# Patient Record
Sex: FEMALE | Race: NATIVE HAWAIIAN OR OTHER PACIFIC ISLANDER | HISPANIC OR LATINO | URBAN - METROPOLITAN AREA
[De-identification: names, ages, dates, MRNs, and addresses within clinical notes are randomized per-mention and may not be internally consistent; named-entity substitution may affect disease eponyms.]

---

## 2024-11-12 ENCOUNTER — OFFICE VISIT (OUTPATIENT)
Dept: FAMILY MEDICINE CLINIC | Facility: CLINIC | Age: 64
End: 2024-11-12
Payer: MEDICAID

## 2024-11-12 VITALS
HEART RATE: 90 BPM | WEIGHT: 187.4 LBS | OXYGEN SATURATION: 98 % | DIASTOLIC BLOOD PRESSURE: 88 MMHG | SYSTOLIC BLOOD PRESSURE: 130 MMHG | BODY MASS INDEX: 33.2 KG/M2 | HEIGHT: 63 IN

## 2024-11-12 DIAGNOSIS — I10 PRIMARY HYPERTENSION: Primary | Chronic | ICD-10-CM

## 2024-11-12 DIAGNOSIS — R20.0 NUMBNESS OF LEFT HAND: Chronic | ICD-10-CM

## 2024-11-12 DIAGNOSIS — Z13.0 SCREENING FOR DEFICIENCY ANEMIA: ICD-10-CM

## 2024-11-12 DIAGNOSIS — Z13.220 SCREENING CHOLESTEROL LEVEL: ICD-10-CM

## 2024-11-12 PROCEDURE — 3079F DIAST BP 80-89 MM HG: CPT | Performed by: NURSE PRACTITIONER

## 2024-11-12 PROCEDURE — 99204 OFFICE O/P NEW MOD 45 MIN: CPT | Performed by: NURSE PRACTITIONER

## 2024-11-12 PROCEDURE — 3075F SYST BP GE 130 - 139MM HG: CPT | Performed by: NURSE PRACTITIONER

## 2024-11-12 RX ORDER — DULOXETIN HYDROCHLORIDE 30 MG/1
30 CAPSULE, DELAYED RELEASE ORAL DAILY
Qty: 90 CAPSULE | Refills: 0 | Status: SHIPPED | OUTPATIENT
Start: 2024-11-12

## 2024-11-12 RX ORDER — HYDROCHLOROTHIAZIDE 25 MG/1
25 TABLET ORAL DAILY
COMMUNITY

## 2024-11-12 NOTE — PROGRESS NOTES
"Chief Complaint  Hypertension (Establish care and check htn pt states that she has numbness in hands and has some concerns )    Subjective        HPI   History of Present Illness      Roderick presents to [facility] to establish care, to follow-up on Hypertension and with complaint of Numbness in Left Hand:    Hypertension - She is taking HCTZ 25mg tablet daily.  She took medication today.  She denies having any complaints.    Left hand numbness - she reports numbness in hand has been going on for 6 months that is constant.  She reports her pain feels like when she sits on her leg and it falls asleep.  She reports is able to feel touch in hand but cannot feel sensation when touching fingertips.  She is right hand dominant.  She has not seen anyone for this problem in the past.  She has been in [place] for about 2 months.  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  Prior to working at [employer], she also had job cleaning a beauty salon.  She takes Ibuprofen for her pain and it helps slightly.  She reports pain worsens with house chores.  Her pain improves also when taking Glucosamine tablets.      :  Daysi  [id number]     Objective   Vital Signs:   Vitals:    11/12/24 1017 11/12/24 1047   BP: 146/86 130/88  Comment: re-checked   BP Location: Right arm Right arm   Patient Position: Sitting Sitting   Cuff Size: Adult Adult   Pulse: 90    SpO2: 98%    Weight: 85 kg (187 lb 6.4 oz)    Height: 160 cm (62.99\")                Physical Exam  Vitals and nursing note reviewed.   Constitutional:       General: She is not in acute distress.     Appearance: Normal appearance. She is not ill-appearing.   HENT:      Head: Normocephalic and atraumatic.   Cardiovascular:      Rate and Rhythm: Normal rate and regular rhythm.      Heart sounds: Normal heart sounds. No murmur heard.  Pulmonary:      Effort: Pulmonary effort is normal. No respiratory distress.      " Breath sounds: Normal breath sounds. No wheezing.   Musculoskeletal:      Left hand: Swelling present. No deformity, lacerations, tenderness or bony tenderness. Normal range of motion. Normal strength. Normal sensation. Normal pulse.      Comments: Non-tender on palpation over left upper extremity of shoulder, bicep, forearm, wrist, hand and fingers; minimal swelling of left hand; finger and finger tip sensation normal with light touch; skin warm; no discoloration of skin; Negative Tinel's and Phalen's Test.   Skin:     General: Skin is warm.      Findings: No rash.   Neurological:      Mental Status: She is alert.          Result Review :                Assessment and Plan    Assessment & Plan  Primary hypertension  Hypertension is stable on HCTZ 25mg daily.  Continue current treatment plan.  Decrease table salt and foods high in salt.  Weight loss.  Increase activity daily.  Discussed minimizing Ibuprofen due to elevated BP.  Lab:  CMP  Blood pressure will be re-assessed at next visit in 1 month.  Orders:    CBC & Differential    Comprehensive Metabolic Panel    Numbness of left hand  Minimize overuse of left hand.  Alternate ice/heat over affected area for 10-15 mins, 2-3 times a day PRN.  Wear carpal tunnel wrist guard on left hand at bedtime only, remove in AM.  Take (OTC) Tylenol, as directed on package, PRN pain.  Do not exceed MAX total dose of 3000mg Tylenol daily.  Rx:  Cymbalta 30mg daily  Referral to Hand Surgeon for eval/treat.  Orders:    DULoxetine (CYMBALTA) 30 MG capsule; Take 1 capsule by mouth Daily.    Ambulatory Referral to Hand Surgery    Screening cholesterol level  Lab:  Lipid Panel  Will continue to monitor.  Orders:    Comprehensive Metabolic Panel    Lipid Panel    Screening for deficiency anemia  Lab:  CBC  Will continue to monitor.  Orders:    CBC & Differential          Follow Up   Return in about 1 month (around 12/12/2024) for Next scheduled follow up - Left Hand Numbness.  Patient  was given instructions and counseling regarding her condition or for health maintenance advice. Please see specific information pulled into the AVS if appropriate.

## 2024-11-13 LAB
ALBUMIN SERPL-MCNC: 4.2 G/DL (ref 3.5–5.2)
ALBUMIN/GLOB SERPL: 1.2 G/DL
ALP SERPL-CCNC: 70 U/L (ref 39–117)
ALT SERPL-CCNC: 18 U/L (ref 1–33)
AST SERPL-CCNC: 21 U/L (ref 1–32)
BASOPHILS # BLD AUTO: 0.05 10*3/MM3 (ref 0–0.2)
BASOPHILS NFR BLD AUTO: 0.7 % (ref 0–1.5)
BILIRUB SERPL-MCNC: 0.6 MG/DL (ref 0–1.2)
BUN SERPL-MCNC: 15 MG/DL (ref 8–23)
BUN/CREAT SERPL: 22.1 (ref 7–25)
CALCIUM SERPL-MCNC: 9.8 MG/DL (ref 8.6–10.5)
CHLORIDE SERPL-SCNC: 101 MMOL/L (ref 98–107)
CHOLEST SERPL-MCNC: 244 MG/DL (ref 0–200)
CO2 SERPL-SCNC: 33.2 MMOL/L (ref 22–29)
CREAT SERPL-MCNC: 0.68 MG/DL (ref 0.57–1)
EGFRCR SERPLBLD CKD-EPI 2021: 98 ML/MIN/1.73
EOSINOPHIL # BLD AUTO: 0.19 10*3/MM3 (ref 0–0.4)
EOSINOPHIL NFR BLD AUTO: 2.6 % (ref 0.3–6.2)
ERYTHROCYTE [DISTWIDTH] IN BLOOD BY AUTOMATED COUNT: 13 % (ref 12.3–15.4)
GLOBULIN SER CALC-MCNC: 3.5 GM/DL
GLUCOSE SERPL-MCNC: 113 MG/DL (ref 65–99)
HCT VFR BLD AUTO: 42.7 % (ref 34–46.6)
HDLC SERPL-MCNC: 59 MG/DL (ref 40–60)
HGB BLD-MCNC: 14.2 G/DL (ref 12–15.9)
IMM GRANULOCYTES # BLD AUTO: 0.03 10*3/MM3 (ref 0–0.05)
IMM GRANULOCYTES NFR BLD AUTO: 0.4 % (ref 0–0.5)
LDLC SERPL CALC-MCNC: 156 MG/DL (ref 0–100)
LYMPHOCYTES # BLD AUTO: 1.4 10*3/MM3 (ref 0.7–3.1)
LYMPHOCYTES NFR BLD AUTO: 18.8 % (ref 19.6–45.3)
MCH RBC QN AUTO: 27.9 PG (ref 26.6–33)
MCHC RBC AUTO-ENTMCNC: 33.3 G/DL (ref 31.5–35.7)
MCV RBC AUTO: 83.9 FL (ref 79–97)
MONOCYTES # BLD AUTO: 0.78 10*3/MM3 (ref 0.1–0.9)
MONOCYTES NFR BLD AUTO: 10.5 % (ref 5–12)
NEUTROPHILS # BLD AUTO: 4.99 10*3/MM3 (ref 1.7–7)
NEUTROPHILS NFR BLD AUTO: 67 % (ref 42.7–76)
NRBC BLD AUTO-RTO: 0 /100 WBC (ref 0–0.2)
PLATELET # BLD AUTO: 334 10*3/MM3 (ref 140–450)
POTASSIUM SERPL-SCNC: 3.8 MMOL/L (ref 3.5–5.2)
PROT SERPL-MCNC: 7.7 G/DL (ref 6–8.5)
RBC # BLD AUTO: 5.09 10*6/MM3 (ref 3.77–5.28)
SODIUM SERPL-SCNC: 141 MMOL/L (ref 136–145)
TRIGL SERPL-MCNC: 162 MG/DL (ref 0–150)
VLDLC SERPL CALC-MCNC: 29 MG/DL (ref 5–40)
WBC # BLD AUTO: 7.44 10*3/MM3 (ref 3.4–10.8)

## 2024-11-17 NOTE — ASSESSMENT & PLAN NOTE
Hypertension is stable on HCTZ 25mg daily.  Continue current treatment plan.  Decrease table salt and foods high in salt.  Weight loss.  Increase activity daily.  Discussed minimizing Ibuprofen due to elevated BP.  Lab:  CMP  Blood pressure will be re-assessed at next visit in 1 month.  Orders:    CBC & Differential    Comprehensive Metabolic Panel

## 2024-11-22 ENCOUNTER — TELEPHONE (OUTPATIENT)
Dept: FAMILY MEDICINE CLINIC | Facility: CLINIC | Age: 64
End: 2024-11-22

## 2024-11-22 DIAGNOSIS — I10 PRIMARY HYPERTENSION: Primary | ICD-10-CM

## 2024-11-22 RX ORDER — HYDROCHLOROTHIAZIDE 25 MG/1
25 TABLET ORAL DAILY
Status: CANCELLED | OUTPATIENT
Start: 2024-11-22

## 2024-11-22 RX ORDER — HYDROCHLOROTHIAZIDE 25 MG/1
25 TABLET ORAL DAILY
Qty: 90 TABLET | Refills: 1 | Status: SHIPPED | OUTPATIENT
Start: 2024-11-22

## 2024-11-22 NOTE — TELEPHONE ENCOUNTER
Caller: Roderick Judge    Relationship: Self    Best call back number: [phone]     Which medication are you concerned about: hydroCHLOROthiazide 25 MG tablet     Who prescribed you this medication: CHAN LAY    When did you start taking this medication: SHE HASN'T IT IS A SCRIPT THAT WAS NOT SENT TO THE PHARMACY    What are your concerns: PATIENT IS WANTING TO KNOW IF OR WHEN IS THIS GOING TO BE CALLED INTO HER PHARMACY      PATIENT WOULD LIKE A CALL BACK PLEASE

## 2024-12-20 ENCOUNTER — OFFICE VISIT (OUTPATIENT)
Dept: FAMILY MEDICINE CLINIC | Facility: CLINIC | Age: 64
End: 2024-12-20
Payer: MEDICAID

## 2024-12-20 VITALS
WEIGHT: 192 LBS | SYSTOLIC BLOOD PRESSURE: 122 MMHG | BODY MASS INDEX: 34.02 KG/M2 | HEIGHT: 63 IN | HEART RATE: 86 BPM | DIASTOLIC BLOOD PRESSURE: 82 MMHG | OXYGEN SATURATION: 98 %

## 2024-12-20 DIAGNOSIS — E78.2 MIXED HYPERLIPIDEMIA: ICD-10-CM

## 2024-12-20 DIAGNOSIS — I10 PRIMARY HYPERTENSION: Primary | ICD-10-CM

## 2024-12-20 DIAGNOSIS — R73.9 BLOOD GLUCOSE ELEVATED: ICD-10-CM

## 2024-12-20 PROCEDURE — 3074F SYST BP LT 130 MM HG: CPT | Performed by: NURSE PRACTITIONER

## 2024-12-20 PROCEDURE — 3079F DIAST BP 80-89 MM HG: CPT | Performed by: NURSE PRACTITIONER

## 2024-12-20 PROCEDURE — 99214 OFFICE O/P EST MOD 30 MIN: CPT | Performed by: NURSE PRACTITIONER

## 2024-12-20 NOTE — ASSESSMENT & PLAN NOTE
Hypertension is controlled.  Decrease table salt and foods high in salt.  Weight loss.  Increase activity daily.  Continue HCTZ 25mg daily.  Blood pressure will be re-assessed in 3 months during Annual Physical.

## 2024-12-20 NOTE — PROGRESS NOTES
"Chief Complaint  Hypertension (Follow up on bp )    Subjective        HPI   History of Present Illness      Roderick presents to [facility] for follow-up on Hypertension:    Hypertension - she takes HCTZ daily every morning.  She denies having any complaints today.    Hyperlipidemia - elevated on prior lab.  Discussed importance of lifestyle changes.  Discussed ASCVD risk 6%.    Elevated Glucose blood level - elevated on prior lab but was not fasting prior to lab.  Discussed importance of cutting back on eating/drinking sugar/carbs.    :  Gautam ID# [id number]          Objective   Vital Signs:   Vitals:    12/20/24 1146   BP: 122/82   BP Location: Right arm   Patient Position: Sitting   Cuff Size: Adult   Pulse: 86   SpO2: 98%   Weight: 87.1 kg (192 lb)   Height: 160 cm (62.99\")         The 10-year ASCVD risk score (Keely BILL, et al., 2019) is: 6%    Values used to calculate the score:      Age: 64 years      Sex: Female      Is Non- : No      Diabetic: No      Tobacco smoker: No      Systolic Blood Pressure: 122 mmHg      Is BP treated: Yes      HDL Cholesterol: 59 mg/dL      Total Cholesterol: 244 mg/dL            Physical Exam  Vitals and nursing note reviewed.   Constitutional:       General: She is not in acute distress.     Appearance: Normal appearance. She is not ill-appearing.   HENT:      Head: Normocephalic and atraumatic.   Cardiovascular:      Rate and Rhythm: Normal rate and regular rhythm.      Heart sounds: Normal heart sounds. No murmur heard.  Pulmonary:      Effort: Pulmonary effort is normal. No respiratory distress.      Breath sounds: Normal breath sounds. No wheezing.   Musculoskeletal:      Right lower leg: No edema.      Left lower leg: No edema.   Neurological:      Mental Status: She is alert.          Result Review :     The following data was reviewed by: SUMAYA Foster on 12/20/2024:      CBC & Differential " (11/12/2024 11:17)  Comprehensive Metabolic Panel (11/12/2024 11:17)  Lipid Panel (11/12/2024 11:17)     Assessment and Plan    Assessment & Plan  Primary hypertension  Hypertension is controlled.  Decrease table salt and foods high in salt.  Weight loss.  Increase activity daily.  Continue HCTZ 25mg daily.  Blood pressure will be re-assessed in 3 months during Annual Physical.       Mixed hyperlipidemia  Lipid abnormalities are elevated with diet.  Discussed ASCVD Risk 6%.  Encouraged lifestyle changes.  Will re-assess lipids today.         Blood glucose elevated  Blood glucose elevated on prior lab.  Discussed importance of cutting back on sugar/carbs.  Increase activity daily.  Will continue to monitor.            Follow Up   Return in about 3 months (around 3/20/2025) for Next scheduled follow up - HTN, Annual physical.  Patient was given instructions and counseling regarding her condition or for health maintenance advice. Please see specific information pulled into the AVS if appropriate.

## 2024-12-20 NOTE — ASSESSMENT & PLAN NOTE
Lipid abnormalities are elevated with diet.  Discussed ASCVD Risk 6%.  Encouraged lifestyle changes.  Will re-assess lipids today.

## 2024-12-20 NOTE — ASSESSMENT & PLAN NOTE
Blood glucose elevated on prior lab.  Discussed importance of cutting back on sugar/carbs.  Increase activity daily.  Will continue to monitor.

## 2025-01-21 DIAGNOSIS — R20.0 NUMBNESS OF LEFT HAND: Chronic | ICD-10-CM

## 2025-01-22 ENCOUNTER — TELEPHONE (OUTPATIENT)
Dept: FAMILY MEDICINE CLINIC | Facility: CLINIC | Age: 65
End: 2025-01-22

## 2025-01-22 RX ORDER — DULOXETIN HYDROCHLORIDE 30 MG/1
30 CAPSULE, DELAYED RELEASE ORAL DAILY
Qty: 90 CAPSULE | Refills: 0 | Status: SHIPPED | OUTPATIENT
Start: 2025-01-22

## 2025-01-22 NOTE — TELEPHONE ENCOUNTER
Hub to relay     Called the pt to see if they would like there referral that was placed by there provider for handsurgery

## 2025-03-19 ENCOUNTER — OFFICE VISIT (OUTPATIENT)
Dept: FAMILY MEDICINE CLINIC | Facility: CLINIC | Age: 65
End: 2025-03-19
Payer: MEDICAID

## 2025-03-19 VITALS
OXYGEN SATURATION: 97 % | SYSTOLIC BLOOD PRESSURE: 138 MMHG | RESPIRATION RATE: 16 BRPM | BODY MASS INDEX: 34.38 KG/M2 | WEIGHT: 194 LBS | DIASTOLIC BLOOD PRESSURE: 86 MMHG | HEART RATE: 83 BPM | HEIGHT: 63 IN

## 2025-03-19 DIAGNOSIS — K59.00 CONSTIPATION, UNSPECIFIED CONSTIPATION TYPE: Chronic | ICD-10-CM

## 2025-03-19 DIAGNOSIS — Z12.11 SCREENING FOR COLON CANCER: ICD-10-CM

## 2025-03-19 DIAGNOSIS — Z00.00 HEALTH CARE MAINTENANCE: ICD-10-CM

## 2025-03-19 DIAGNOSIS — Z53.20 CERVICAL CANCER SCREENING DECLINED: ICD-10-CM

## 2025-03-19 DIAGNOSIS — I10 PRIMARY HYPERTENSION: Chronic | ICD-10-CM

## 2025-03-19 DIAGNOSIS — R73.9 BLOOD GLUCOSE ELEVATED: ICD-10-CM

## 2025-03-19 DIAGNOSIS — E78.2 MIXED HYPERLIPIDEMIA: Chronic | ICD-10-CM

## 2025-03-19 DIAGNOSIS — E11.65 TYPE 2 DIABETES MELLITUS WITH HYPERGLYCEMIA, WITHOUT LONG-TERM CURRENT USE OF INSULIN: ICD-10-CM

## 2025-03-19 DIAGNOSIS — Z00.00 ENCOUNTER FOR ANNUAL PHYSICAL EXAM: Primary | ICD-10-CM

## 2025-03-19 DIAGNOSIS — Z12.31 ENCOUNTER FOR SCREENING MAMMOGRAM FOR BREAST CANCER: ICD-10-CM

## 2025-03-19 LAB
EXPIRATION DATE: ABNORMAL
HBA1C MFR BLD: 6.5 % (ref 4.5–5.7)
Lab: ABNORMAL

## 2025-03-19 RX ORDER — HYDROCHLOROTHIAZIDE 25 MG/1
25 TABLET ORAL DAILY
Qty: 90 TABLET | Refills: 1 | Status: SHIPPED | OUTPATIENT
Start: 2025-03-19

## 2025-03-19 RX ORDER — AMOXICILLIN 250 MG
1 CAPSULE ORAL DAILY
Qty: 90 TABLET | Refills: 1 | Status: SHIPPED | OUTPATIENT
Start: 2025-03-19

## 2025-03-19 NOTE — PROGRESS NOTES
Chief Complaint  Annual checkup.     HISTORY    Roderick Judge is a 64 y.o. female who presents to the office today as an established patient for their annual preventative exam.     No hospitalization(s) within the last year.     Current exercise regimen: None    Status of chronic medical conditions:     Hypertension - controlled on HCTZ 25mg daily every morning.  She denies having any complaints today.     Hyperlipidemia - elevated on prior lab.  Discussed importance of lifestyle changes.      Blood Glucose Elevated - elevated on prior lab but was not fasting prior to lab draw.  Discussed importance of cutting back on eating/drinking sugar/carbs.    :  Robel ID# [id number]    History of Present Illness         Patient's overall comments about their health or any other specific health concerns they report:  She reports her overall health is good.    Constipation - she reports having constipation for many years, but since Hysterectomy 31 years ago constipation worsened.  She reports only having BM every 2-3 days and it is hard to push out.  She reports stool looks like #3 on [place] Scale.  She denies having blood or mucus in stool.  She denies having diarrhea.  Recommended referral to GI for colon cancer screening and patient declined.  Recommended Cologuard for colon cancer screening and she agreed.  She admits drinking tea and it helps with constipation.    Anxiety - she feels good right now, but she just worries about her family in [place], which causes her intermittent anxiety, stress, depression and sleep issues.  She denies this being a problem right now.    First day of last menstrual period if pre-menopausal: Hysterectomy.    Patient's contraception status (if applicable):  N/A    Review of Systems   Constitutional: Negative.    HENT: Negative.     Eyes: Negative.    Respiratory: Negative.     Cardiovascular: Negative.    Gastrointestinal:  Negative for constipation (drinks tea and helps with  constipation), diarrhea, nausea and vomiting.   Endocrine: Negative.    Genitourinary: Negative.    Musculoskeletal:  Positive for back pain (been intermittent for about 5-6 years, controlled with ibuprofen).   Skin: Negative.    Allergic/Immunologic: Negative.    Neurological:  Positive for dizziness (intermittent when wakes up or lays down in bed).   Hematological: Negative.    Psychiatric/Behavioral:  Positive for sleep disturbance (sometimes), depressed mood and stress (stress/depr/anx about family in [place]). Negative for agitation, behavioral problems, decreased concentration, dysphoric mood, hallucinations, self-injury, suicidal ideas and negative for hyperactivity. The patient is nervous/anxious (sometimes will feel anxious).         Health Maintenance Summary            Current Care Gaps       DIABETIC FOOT EXAM (Yearly) Never done     No completion history exists for this topic.              DIABETIC EYE EXAM (Yearly) Never done     No completion history exists for this topic.              URINE MICROALBUMIN-CREATININE RATIO (uACR) (Yearly) Never done     No completion history exists for this topic.              Pneumococcal Vaccine 50+ (1 of 2 - PCV) Never done     No completion history exists for this topic.              TDAP/TD VACCINES (1 - Tdap) Never done     No completion history exists for this topic.              COLORECTAL CANCER SCREENING (View Topic Details) Never done     No completion history exists for this topic.              ZOSTER VACCINE (1 of 2) Never done     No completion history exists for this topic.              COVID-19 Vaccine (1 - 2024-25 season) Never done     No completion history exists for this topic.              HEPATITIS C SCREENING (Once) Never done     No completion history exists for this topic.              ANNUAL PHYSICAL (Yearly) Never done     No completion history exists for this topic.                      Awaiting Completion       MAMMOGRAM (Every 2 Years) Order  placed this encounter      03/19/2025  Order placed for Mammo screening digital tomosynthesis bilateral w CAD by Pebbles Madsen APRN                      Upcoming       INFLUENZA VACCINE (Yearly - July to March) Next due on 7/1/2025     No completion history exists for this topic.              HEMOGLOBIN A1C (Every 6 Months) Next due on 9/19/2025 03/19/2025  Hemoglobin A1C component of POCT glycated hemoglobin, total              LIPID PANEL (Yearly) Next due on 11/12/2025 11/12/2024  Lipid Panel                             Allergies   Allergen Reactions    Tramadol-Acetaminophen Anaphylaxis        Outpatient Medications Marked as Taking for the 3/19/25 encounter (Office Visit) with Pebbles Madsen APRN   Medication Sig Dispense Refill    hydroCHLOROthiazide 25 MG tablet Take 1 tablet by mouth Daily. 90 tablet 1    [DISCONTINUED] hydroCHLOROthiazide 25 MG tablet Take 1 tablet by mouth Daily. 90 tablet 1        Past Medical History:   Diagnosis Date    Hypertension      Past Surgical History:   Procedure Laterality Date    HYSTERECTOMY Bilateral     pt states when she was 37 she had total     Family History   Problem Relation Age of Onset    Hypertension Mother     Diabetes Mother     Hypertension Father     Prostate cancer Father     Arrhythmia Father     Hypertension Brother     Other (CVA) Brother     Hypertension Brother     Benign prostatic hyperplasia Brother     Hypertension Brother     Other (MI) Brother     Hypertension Brother     Diabetes Brother     Hypertension Brother     Kidney disease Brother     Lung cancer Brother     Heart disease Brother     reports that she has quit smoking. Her smoking use included cigarettes. She has a 0.5 pack-year smoking history. She has never been exposed to tobacco smoke. She has never used smokeless tobacco. She reports that she does not currently use alcohol. She reports that she does not use drugs.      There is no immunization history on file for this  "patient.     OBJECTIVE    Vital Signs:   /86 (BP Location: Right arm, Patient Position: Sitting, Cuff Size: Adult)   Pulse 83   Resp 16   Ht 160 cm (62.99\")   Wt 88 kg (194 lb)   SpO2 97% Comment: re-checked  BMI 34.37 kg/m²     Physical Exam  Vitals and nursing note reviewed.   Constitutional:       General: She is not in acute distress.     Appearance: Normal appearance. She is not ill-appearing.   HENT:      Head: Normocephalic and atraumatic.      Salivary Glands: Right salivary gland is not diffusely enlarged or tender. Left salivary gland is not diffusely enlarged or tender.      Right Ear: Tympanic membrane, ear canal and external ear normal. There is no impacted cerumen.      Left Ear: Tympanic membrane, ear canal and external ear normal. There is no impacted cerumen.      Nose: Nose normal. No congestion or rhinorrhea.      Mouth/Throat:      Mouth: Mucous membranes are moist.      Pharynx: No oropharyngeal exudate or posterior oropharyngeal erythema.   Eyes:      General: No scleral icterus.        Right eye: No discharge.         Left eye: No discharge.      Extraocular Movements: Extraocular movements intact.      Conjunctiva/sclera: Conjunctivae normal.      Pupils: Pupils are equal, round, and reactive to light.   Neck:      Thyroid: No thyromegaly or thyroid tenderness.      Vascular: No carotid bruit.   Cardiovascular:      Rate and Rhythm: Normal rate and regular rhythm.      Pulses: Normal pulses.      Heart sounds: Normal heart sounds. No murmur heard.  Pulmonary:      Effort: Pulmonary effort is normal. No respiratory distress.      Breath sounds: Normal breath sounds. No wheezing.   Abdominal:      General: Bowel sounds are normal. There is no distension.      Palpations: Abdomen is soft. There is no mass.      Tenderness: There is no abdominal tenderness. There is no right CVA tenderness, left CVA tenderness, guarding or rebound.      Hernia: There is no hernia in the umbilical area " or ventral area.   Genitourinary:     Comments: Deferred.  Musculoskeletal:         General: No swelling or tenderness. Normal range of motion.      Cervical back: Neck supple. No rigidity or tenderness.      Right lower leg: No edema.      Left lower leg: No edema.   Lymphadenopathy:      Cervical: No cervical adenopathy.   Skin:     Findings: No lesion or rash.      Comments: No lesion or rash seen on skin that was exposed during assessment.   Neurological:      General: No focal deficit present.      Mental Status: She is alert.      Sensory: No sensory deficit.      Motor: No weakness.      Coordination: Coordination normal.      Gait: Gait normal.   Psychiatric:         Mood and Affect: Mood normal.         Behavior: Behavior normal.          The following data was reviewed by: SUMAYA Foster on 03/19/2025:    CBC & Differential (11/12/2024 11:17)  Comprehensive Metabolic Panel (11/12/2024 11:17)  Lipid Panel (11/12/2024 11:17)                   ASSESSMENT & PLAN   Diagnoses and all orders for this visit:    1. Encounter for annual physical exam (Primary)    2. Primary hypertension  Assessment & Plan:  Decrease table salt and foods high in salt.  Weight loss.  Increase activity daily.  Continue HCTZ 25mg daily.  Blood pressure will be re-assessed in 3 months.       Orders:  -     hydroCHLOROthiazide 25 MG tablet; Take 1 tablet by mouth Daily.  Dispense: 90 tablet; Refill: 1    3. Mixed hyperlipidemia  Assessment & Plan:  Encouraged lifestyle changes.  Will continue to monitor.      4. Blood glucose elevated  Assessment & Plan:  Discussed importance of cutting back on sugar/carbs.  Increase activity daily.  Will screen for DM today.    Orders:  -     POCT glycated hemoglobin, total    5. Type 2 diabetes mellitus with hyperglycemia, without long-term current use of insulin  Assessment & Plan:  Diabetes is newly identified. A1c 6.5 today.  Recommended an ADA diet.  Recommended a Mediterranean style of  eating  Regular aerobic exercise.  Discussed ways to avoid symptomatic hypoglycemia.  Discussed foot care.  Reminded to get yearly retinal exam.  Encourage lifestyle changes.  Diabetes will be reassessed in 3 months      6. Constipation, unspecified constipation type  Assessment & Plan:  Increase water intake, decrease caffeine.  Increase activity daily.  Increase intake foods high in fiber.  Senna-Docusate Sodium daily and titrate for soft stool.  Will continue to monitor.     Orders:  -     sennosides-docusate (senna-docusate sodium) 8.6-50 MG per tablet; Take 1 tablet by mouth Daily.  Dispense: 90 tablet; Refill: 1    7. Screening for colon cancer  Comments:  Patient declined colonoscopy and agreed to do Cologuard for colon cancer screening.  Orders:  -     Cologuard - Stool, Per Rectum; Future    8. Cervical cancer screening declined  Comments:  Patient had hysterectomy 31 years ago.  Recommended cervical screening and patient declined.    9. Encounter for screening mammogram for breast cancer  Comments:  Patient declined having breast concerns today.  She agreed to get bilateral breast cancer screening by Mammogram.  Orders:  -     Mammo screening digital tomosynthesis bilateral w CAD; Future    10. Health care maintenance  Comments:  Use sunscreen w/sun expsure.  Working smoke/Carbon Monoxide detectors in home.  Wear seatbelt.  Annual Vision/Dental exam.  Increase activity 30min/day x 5 days      #Annual Preventative Health Examination   -Age and sex appropriate physical exam performed and documented. Updated past medical, family, social and surgical histories as well as allergies and care team list. Addressed care gaps listed in the medical record.  -Encouraged seat belt use for every car ride for patient and all occupants.  Encouraged sunscreen use to reduce risk of skin cancer for any days with sun exposure over 20 minutes. Discussed the importance of smoke and carbon monoxide detectors in the home.    -Encouraged annual dental and vision exams as part of their overall health.  -Encouraged minimum of 30 minutes or more of exercise at a brisk walk or higher 5 days per week combined with a well-balanced diet.  -Immunizations reviewed and updated in EMR.  Discussed importance of staying up to date on preventative vaccines and patient declined vaccine today.  -Advised that all women who are planning or capable of pregnancy take a daily supplement containing 0.4 to 0.8 mg (400 to 800 ?g) of folic acid.  The 10-year ASCVD risk score (Keely BILL, et al., 2019) is: 14.3%    Values used to calculate the score:      Age: 64 years      Sex: Female      Is Non- : No      Diabetic: Yes      Tobacco smoker: No      Systolic Blood Pressure: 138 mmHg      Is BP treated: Yes      HDL Cholesterol: 59 mg/dL      Total Cholesterol: 244 mg/dL   -Lipid screening:   Lipid Panel          11/12/2024    11:17   Lipid Panel   Total Cholesterol 244    Triglycerides 162    HDL Cholesterol 59    VLDL Cholesterol 29    LDL Cholesterol  156     Patient is over age 40 and a 10-year ASCVD risk was calculated which does not indicate a need for statin therapy. See plan below.    -Aspirin for primary or secondary prevention: Not applicable, patient is greater than age 60 and risks outweigh benefits for primary prevention.  -Depression and Anxiety screening: Patient denies symptom of anxiety or depression.  She is able to manage her anxiety and depression currently.  -Diabetes screening:  Will check A1c today.  -Tobacco use screening: Patient denies cigarette use. Tobacco counseling was was not indicated.  -Alcohol use screening: Patient denies alcohol consumption.. Alcohol abuse counseling was was not indicated.  -Illicit drug screening: Patient does not use illicit drugs.  -Hypertension screening: Patient with known diagnosis of hypertension and is receiving treatment.  -HIV screening: Screening not indicated, not in a  high-risk group.  -Syphilis screening: Syphilis screening not indicated.  -Hepatitis B virus screening: Screening not indicated, not in a high-risk group.  -Hepatitis C virus screening:  Screening not indicated, not in a high-risk group.  -Colon cancer screening: Will order Cologuard per patient preference.   -Lung cancer screening: Patient has smoked but does not meet other eligibility criteria for screening.  -Cervical cancer screening:  Informed patient that the [employer]PSTF recommends screening for cervical cancer every 3 years with cervical cytology alone in women aged 21 to 29 years. For women aged 30 to 65 years, the [employer]PSTF recommends screening every 3 years with cervical cytology alone, every 5 years with high-risk human papillomavirus (hrHPV) testing alone, or every 5 years with HPV testing in combination with cytology (cotesting). Lasp pap : Had hysterectomy 31 years ago and no cervical cancer screening since.    Recommended referral to GYN for cervical screening and patient declined.  -Breast cancer screening:  Screening mammogram will be ordered today.  Patient denies ever having mammogram in her life.  -BRCA related cancer screening: Informed patient that the [employer]PSTF recommends that primary care clinicians assess women with a personal or family history of breast, ovarian, tubal, or peritoneal cancer or who have an ancestry associated with breast cancer susceptibility 1 and 2 (BRCA1/2) gene mutations with an appropriate brief familial risk assessment tool and referred to genetic counseling if risk assessment is positive. Patient does not have a known personal or family history.   -Osteoporosis screening: Informed patient that the [employer]PSTF recommends screening for osteoporosis with bone measurement testing to prevent osteoporotic fractures in women 65 years and older. Screening is not applicable at this time.    Follow up in 1 year for annual physical exam.    Patient/family had no further questions at this time  and verbalized understanding of the plan discussed today.     This document has been electronically signed by SUMAYA Foster  April 6, 2025 20:54 EDT

## 2025-04-06 PROBLEM — Z53.20 CERVICAL CANCER SCREENING DECLINED: Status: ACTIVE | Noted: 2025-04-06

## 2025-04-06 PROBLEM — E11.65 TYPE 2 DIABETES MELLITUS WITH HYPERGLYCEMIA: Status: ACTIVE | Noted: 2025-04-06

## 2025-04-06 PROBLEM — K59.00 CONSTIPATION: Status: ACTIVE | Noted: 2025-04-06

## 2025-04-07 NOTE — ASSESSMENT & PLAN NOTE
Decrease table salt and foods high in salt.  Weight loss.  Increase activity daily.  Continue HCTZ 25mg daily.  Blood pressure will be re-assessed in 3 months.

## 2025-04-07 NOTE — ASSESSMENT & PLAN NOTE
Discussed importance of cutting back on sugar/carbs.  Increase activity daily.  Will screen for DM today.

## 2025-04-07 NOTE — ASSESSMENT & PLAN NOTE
Increase water intake, decrease caffeine.  Increase activity daily.  Increase intake foods high in fiber.  Senna-Docusate Sodium daily and titrate for soft stool.  Will continue to monitor.

## 2025-04-07 NOTE — ASSESSMENT & PLAN NOTE
Diabetes is newly identified. A1c 6.5 today.  Recommended an ADA diet.  Recommended a Mediterranean style of eating  Regular aerobic exercise.  Discussed ways to avoid symptomatic hypoglycemia.  Discussed foot care.  Reminded to get yearly retinal exam.  Encourage lifestyle changes.  Diabetes will be reassessed in 3 months